# Patient Record
Sex: FEMALE | Race: WHITE | NOT HISPANIC OR LATINO | Employment: FULL TIME | URBAN - METROPOLITAN AREA
[De-identification: names, ages, dates, MRNs, and addresses within clinical notes are randomized per-mention and may not be internally consistent; named-entity substitution may affect disease eponyms.]

---

## 2019-04-16 ENCOUNTER — OFFICE VISIT (OUTPATIENT)
Dept: URGENT CARE | Facility: CLINIC | Age: 29
End: 2019-04-16
Payer: COMMERCIAL

## 2019-04-16 VITALS
BODY MASS INDEX: 34.15 KG/M2 | HEIGHT: 64 IN | OXYGEN SATURATION: 96 % | HEART RATE: 97 BPM | DIASTOLIC BLOOD PRESSURE: 58 MMHG | RESPIRATION RATE: 18 BRPM | SYSTOLIC BLOOD PRESSURE: 116 MMHG | TEMPERATURE: 97.6 F | WEIGHT: 200 LBS

## 2019-04-16 DIAGNOSIS — K52.9 GASTROENTERITIS: ICD-10-CM

## 2019-04-16 DIAGNOSIS — Z20.828 EXPOSURE TO INFLUENZA: ICD-10-CM

## 2019-04-16 LAB
FLUAV+FLUBV AG SPEC QL IA: NEGATIVE
INT CON NEG: NORMAL
INT CON POS: NORMAL

## 2019-04-16 PROCEDURE — 87804 INFLUENZA ASSAY W/OPTIC: CPT | Performed by: PHYSICIAN ASSISTANT

## 2019-04-16 PROCEDURE — 99203 OFFICE O/P NEW LOW 30 MIN: CPT | Performed by: PHYSICIAN ASSISTANT

## 2019-04-16 RX ORDER — ONDANSETRON 4 MG/1
4 TABLET, FILM COATED ORAL EVERY 8 HOURS PRN
Qty: 15 EACH | Refills: 0 | Status: SHIPPED | OUTPATIENT
Start: 2019-04-16 | End: 2019-04-21

## 2019-04-16 ASSESSMENT — ENCOUNTER SYMPTOMS
HEARTBURN: 0
FLATUS: 1
CHILLS: 1
BLOOD IN STOOL: 0
BLOATING: 1
SORE THROAT: 0
WHEEZING: 0
NAUSEA: 1
FEVER: 1
DIZZINESS: 0
MYALGIAS: 1
VOMITING: 1
ABDOMINAL PAIN: 1
DIARRHEA: 1
EYE REDNESS: 0
EYE DISCHARGE: 0
CONSTIPATION: 0
COUGH: 1

## 2019-04-16 NOTE — PROGRESS NOTES
"Subjective:      Lillian Valenzuela is a 28 y.o. female who presents with Diarrhea ([id number], vomiting, diarrhea, stomach cramping, nausea)            Patient is a pleasant 28-year-old female presents to urgent care with vomiting and diarrhea and intermittent abdominal cramping since last night.  Patient does report subjective fevers last night as well.  She is concerned as she has several ill contacts with influenza along with neurovirus.  She is requesting testing for influenza as well today.      Diarrhea    This is a new problem. The current episode started yesterday. The problem occurs 5 to 10 times per day. The problem has been waxing and waning. The stool consistency is described as watery and mucous. Associated symptoms include abdominal pain, bloating, chills, coughing, a fever, increased flatus, myalgias and vomiting. Risk factors include ill contacts (Recent ill contacts of influenza and Norovirus. ). She has tried nothing for the symptoms.       Review of Systems   Constitutional: Positive for chills, fever and malaise/fatigue.   HENT: Negative for congestion and sore throat.    Eyes: Negative for discharge and redness.   Respiratory: Positive for cough. Negative for wheezing.    Gastrointestinal: Positive for abdominal pain, bloating, diarrhea, flatus, nausea and vomiting. Negative for blood in stool, constipation, heartburn and melena.   Genitourinary: Negative for dysuria and urgency.   Musculoskeletal: Positive for myalgias.   Skin: Negative for rash.   Neurological: Negative for dizziness.   All other systems reviewed and are negative.         Objective:     /58 (BP Location: Left arm, Patient Position: Sitting, BP Cuff Size: Adult)   Pulse 97   Temp 36.4 °C (97.6 °F) (Temporal)   Resp 18   Ht 1.626 m (5' 4\")   Wt 90.7 kg (200 lb)   SpO2 96%   BMI 34.33 kg/m²    PMH:  has no past medical history on file.  MEDS:   Current Outpatient Prescriptions:   •  ondansetron (ZOFRAN) 4 MG Tab " tablet, Take 1 Tab by mouth every 8 hours as needed for Nausea/Vomiting for up to 5 days., Disp: 15 Each, Rfl: 0  ALLERGIES:   Allergies   Allergen Reactions   • Penicillins Rash     Rash as a baby     SURGHX: No past surgical history on file.  SOCHX:  reports that she has never smoked. She has never used smokeless tobacco.  FH: Family history was reviewed, no pertinent findings to report    Physical Exam   Constitutional: She is oriented to person, place, and time. She appears well-developed and well-nourished.   HENT:   Head: Normocephalic and atraumatic.   Right Ear: External ear normal.   Left Ear: External ear normal.   Nose: Nose normal.   Mouth/Throat: Oropharynx is clear and moist. No oropharyngeal exudate.   Eyes: Pupils are equal, round, and reactive to light. EOM are normal.   Neck: Normal range of motion. Neck supple.   Cardiovascular: Normal rate.    No murmur heard.  Pulmonary/Chest: Effort normal and breath sounds normal. No respiratory distress.   Abdominal: Soft. She exhibits no mass. There is no tenderness. There is no rebound and no guarding. No hernia.   Minimal mid-epigastric tenderness- without rigidity.   Hyperactive bowel sounds. Neg. Heel tap.    Lymphadenopathy:     She has no cervical adenopathy.   Neurological: She is alert and oriented to person, place, and time.   Skin: Skin is warm. No rash noted. No pallor.   Good skin turgor.      Psychiatric: She has a normal mood and affect. Her behavior is normal.   Vitals reviewed.            POC negative.     Assessment/Plan:     1. Gastroenteritis  - ondansetron (ZOFRAN) 4 MG Tab tablet; Take 1 Tab by mouth every 8 hours as needed for Nausea/Vomiting for up to 5 days.  Dispense: 15 Each; Refill: 0    2. Exposure to influenza  - POCT Influenza A/B    Pt. is to start a 24 hour clear liquid diet, and is to avoid the consumption of red dye to monitor for blood in their stool. Pt declines stool studies.     Once able to tolerate said diet, pt. is to  start on the BRAT diet and avoid the consumption of dairy for a few days. Pt. is to increased fluids as tolerated. Once improved, strongly advised pt. to avoid greasy/fatty foods for a least a week, and encouraged a probiotic. Pt. Is to RTC if not improving/worsening or has any further concerns.